# Patient Record
Sex: FEMALE | Race: ASIAN
[De-identification: names, ages, dates, MRNs, and addresses within clinical notes are randomized per-mention and may not be internally consistent; named-entity substitution may affect disease eponyms.]

---

## 2017-07-06 ENCOUNTER — APPOINTMENT (OUTPATIENT)
Dept: CT IMAGING | Facility: HOSPITAL | Age: 57
End: 2017-07-06

## 2017-07-31 PROBLEM — Z00.00 ENCOUNTER FOR PREVENTIVE HEALTH EXAMINATION: Status: ACTIVE | Noted: 2017-07-31

## 2017-08-03 ENCOUNTER — APPOINTMENT (OUTPATIENT)
Dept: CT IMAGING | Facility: HOSPITAL | Age: 57
End: 2017-08-03

## 2017-08-14 ENCOUNTER — RESULT REVIEW (OUTPATIENT)
Age: 57
End: 2017-08-14

## 2017-08-14 ENCOUNTER — INPATIENT (INPATIENT)
Facility: HOSPITAL | Age: 57
LOS: 0 days | Discharge: HOME | DRG: 305 | End: 2017-08-15
Attending: FAMILY MEDICINE | Admitting: FAMILY MEDICINE
Payer: COMMERCIAL

## 2017-08-14 ENCOUNTER — APPOINTMENT (OUTPATIENT)
Dept: INTERVENTIONAL RADIOLOGY/VASCULAR | Facility: HOSPITAL | Age: 57
End: 2017-08-14
Payer: MEDICAID

## 2017-08-14 ENCOUNTER — OUTPATIENT (OUTPATIENT)
Dept: OUTPATIENT SERVICES | Facility: HOSPITAL | Age: 57
LOS: 1 days | End: 2017-08-14
Payer: COMMERCIAL

## 2017-08-14 VITALS
HEART RATE: 69 BPM | OXYGEN SATURATION: 99 % | HEIGHT: 63 IN | RESPIRATION RATE: 16 BRPM | WEIGHT: 110.01 LBS | TEMPERATURE: 100 F | DIASTOLIC BLOOD PRESSURE: 93 MMHG | SYSTOLIC BLOOD PRESSURE: 178 MMHG

## 2017-08-14 DIAGNOSIS — I16.0 HYPERTENSIVE URGENCY: ICD-10-CM

## 2017-08-14 DIAGNOSIS — N18.9 CHRONIC KIDNEY DISEASE, UNSPECIFIED: ICD-10-CM

## 2017-08-14 DIAGNOSIS — Z29.9 ENCOUNTER FOR PROPHYLACTIC MEASURES, UNSPECIFIED: ICD-10-CM

## 2017-08-14 DIAGNOSIS — E11.9 TYPE 2 DIABETES MELLITUS WITHOUT COMPLICATIONS: ICD-10-CM

## 2017-08-14 DIAGNOSIS — I10 ESSENTIAL (PRIMARY) HYPERTENSION: ICD-10-CM

## 2017-08-14 DIAGNOSIS — N04.9 NEPHROTIC SYNDROME WITH UNSPECIFIED MORPHOLOGIC CHANGES: ICD-10-CM

## 2017-08-14 DIAGNOSIS — E78.00 PURE HYPERCHOLESTEROLEMIA, UNSPECIFIED: ICD-10-CM

## 2017-08-14 LAB
ALBUMIN SERPL ELPH-MCNC: 2.1 G/DL — LOW (ref 3.5–5)
ALP SERPL-CCNC: 69 U/L — SIGNIFICANT CHANGE UP (ref 40–120)
ALT FLD-CCNC: 15 U/L DA — SIGNIFICANT CHANGE UP (ref 10–60)
ANION GAP SERPL CALC-SCNC: 5 MMOL/L — SIGNIFICANT CHANGE UP (ref 5–17)
APTT BLD: 29.3 SEC — SIGNIFICANT CHANGE UP (ref 27.5–37.4)
AST SERPL-CCNC: 22 U/L — SIGNIFICANT CHANGE UP (ref 10–40)
BASOPHILS # BLD AUTO: 0.1 K/UL — SIGNIFICANT CHANGE UP (ref 0–0.2)
BASOPHILS NFR BLD AUTO: 1.2 % — SIGNIFICANT CHANGE UP (ref 0–2)
BILIRUB SERPL-MCNC: 0.4 MG/DL — SIGNIFICANT CHANGE UP (ref 0.2–1.2)
BUN SERPL-MCNC: 33 MG/DL — HIGH (ref 7–18)
CALCIUM SERPL-MCNC: 8.7 MG/DL — SIGNIFICANT CHANGE UP (ref 8.4–10.5)
CHLORIDE SERPL-SCNC: 109 MMOL/L — HIGH (ref 96–108)
CK MB BLD-MCNC: 1.4 % — SIGNIFICANT CHANGE UP (ref 0–3.5)
CK MB CFR SERPL CALC: 1.5 NG/ML — SIGNIFICANT CHANGE UP (ref 0–3.6)
CK SERPL-CCNC: 105 U/L — SIGNIFICANT CHANGE UP (ref 21–215)
CO2 SERPL-SCNC: 27 MMOL/L — SIGNIFICANT CHANGE UP (ref 22–31)
CREAT SERPL-MCNC: 2.04 MG/DL — HIGH (ref 0.5–1.3)
EOSINOPHIL # BLD AUTO: 0.5 K/UL — SIGNIFICANT CHANGE UP (ref 0–0.5)
EOSINOPHIL NFR BLD AUTO: 6.3 % — HIGH (ref 0–6)
GLUCOSE SERPL-MCNC: 132 MG/DL — HIGH (ref 70–99)
HCT VFR BLD CALC: 36.9 % — SIGNIFICANT CHANGE UP (ref 34.5–45)
HGB BLD-MCNC: 11.8 G/DL — SIGNIFICANT CHANGE UP (ref 11.5–15.5)
INR BLD: 0.87 RATIO — LOW (ref 0.88–1.16)
LYMPHOCYTES # BLD AUTO: 2.1 K/UL — SIGNIFICANT CHANGE UP (ref 1–3.3)
LYMPHOCYTES # BLD AUTO: 28.7 % — SIGNIFICANT CHANGE UP (ref 13–44)
MCHC RBC-ENTMCNC: 26.4 PG — LOW (ref 27–34)
MCHC RBC-ENTMCNC: 31.9 GM/DL — LOW (ref 32–36)
MCV RBC AUTO: 82.7 FL — SIGNIFICANT CHANGE UP (ref 80–100)
MONOCYTES # BLD AUTO: 0.4 K/UL — SIGNIFICANT CHANGE UP (ref 0–0.9)
MONOCYTES NFR BLD AUTO: 6 % — SIGNIFICANT CHANGE UP (ref 2–14)
NEUTROPHILS # BLD AUTO: 4.2 K/UL — SIGNIFICANT CHANGE UP (ref 1.8–7.4)
NEUTROPHILS NFR BLD AUTO: 57.9 % — SIGNIFICANT CHANGE UP (ref 43–77)
NT-PROBNP SERPL-SCNC: 599 PG/ML — HIGH (ref 0–125)
PLATELET # BLD AUTO: 283 K/UL — SIGNIFICANT CHANGE UP (ref 150–400)
POTASSIUM SERPL-MCNC: 4.3 MMOL/L — SIGNIFICANT CHANGE UP (ref 3.5–5.3)
POTASSIUM SERPL-SCNC: 4.3 MMOL/L — SIGNIFICANT CHANGE UP (ref 3.5–5.3)
PROT SERPL-MCNC: 6.9 G/DL — SIGNIFICANT CHANGE UP (ref 6–8.3)
PROTHROM AB SERPL-ACNC: 9.5 SEC — LOW (ref 9.8–12.7)
RBC # BLD: 4.46 M/UL — SIGNIFICANT CHANGE UP (ref 3.8–5.2)
RBC # FLD: 11.6 % — SIGNIFICANT CHANGE UP (ref 10.3–14.5)
SODIUM SERPL-SCNC: 141 MMOL/L — SIGNIFICANT CHANGE UP (ref 135–145)
TROPONIN I SERPL-MCNC: <0.015 NG/ML — SIGNIFICANT CHANGE UP (ref 0–0.04)
WBC # BLD: 7.2 K/UL — SIGNIFICANT CHANGE UP (ref 3.8–10.5)
WBC # FLD AUTO: 7.2 K/UL — SIGNIFICANT CHANGE UP (ref 3.8–10.5)

## 2017-08-14 PROCEDURE — 88305 TISSUE EXAM BY PATHOLOGIST: CPT

## 2017-08-14 PROCEDURE — 88305 TISSUE EXAM BY PATHOLOGIST: CPT | Mod: 26

## 2017-08-14 PROCEDURE — 88346 IMFLUOR 1ST 1ANTB STAIN PX: CPT | Mod: 26

## 2017-08-14 PROCEDURE — 88348 ELECTRON MICROSCOPY DX: CPT | Mod: 26

## 2017-08-14 PROCEDURE — 88348 ELECTRON MICROSCOPY DX: CPT

## 2017-08-14 PROCEDURE — 71010: CPT | Mod: 26

## 2017-08-14 PROCEDURE — 88329 PATH CONSLTJ DRG SURG: CPT

## 2017-08-14 PROCEDURE — 88312 SPECIAL STAINS GROUP 1: CPT

## 2017-08-14 PROCEDURE — 50200 RENAL BIOPSY PERQ: CPT | Mod: LT

## 2017-08-14 PROCEDURE — 88312 SPECIAL STAINS GROUP 1: CPT | Mod: 26

## 2017-08-14 PROCEDURE — 88305 TISSUE EXAM BY PATHOLOGIST: CPT | Mod: 26,59

## 2017-08-14 PROCEDURE — 88350 IMFLUOR EA ADDL 1ANTB STN PX: CPT

## 2017-08-14 PROCEDURE — 88313 SPECIAL STAINS GROUP 2: CPT

## 2017-08-14 PROCEDURE — 76942 ECHO GUIDE FOR BIOPSY: CPT | Mod: 26

## 2017-08-14 PROCEDURE — 88313 SPECIAL STAINS GROUP 2: CPT | Mod: 26

## 2017-08-14 PROCEDURE — 76942 ECHO GUIDE FOR BIOPSY: CPT

## 2017-08-14 PROCEDURE — 88350 IMFLUOR EA ADDL 1ANTB STN PX: CPT | Mod: 26

## 2017-08-14 PROCEDURE — 88346 IMFLUOR 1ST 1ANTB STAIN PX: CPT

## 2017-08-14 PROCEDURE — 99285 EMERGENCY DEPT VISIT HI MDM: CPT

## 2017-08-14 PROCEDURE — 50200 RENAL BIOPSY PERQ: CPT

## 2017-08-14 RX ORDER — FUROSEMIDE 40 MG
20 TABLET ORAL ONCE
Qty: 0 | Refills: 0 | Status: COMPLETED | OUTPATIENT
Start: 2017-08-14 | End: 2017-08-14

## 2017-08-14 RX ORDER — TIMOLOL 0.5 %
1 DROPS OPHTHALMIC (EYE)
Qty: 0 | Refills: 0 | COMMUNITY

## 2017-08-14 RX ORDER — SODIUM CHLORIDE 9 MG/ML
3 INJECTION INTRAMUSCULAR; INTRAVENOUS; SUBCUTANEOUS ONCE
Qty: 0 | Refills: 0 | Status: COMPLETED | OUTPATIENT
Start: 2017-08-14 | End: 2017-08-14

## 2017-08-14 RX ORDER — TIMOLOL 0.5 %
1 DROPS OPHTHALMIC (EYE)
Qty: 0 | Refills: 0 | Status: DISCONTINUED | OUTPATIENT
Start: 2017-08-14 | End: 2017-08-15

## 2017-08-14 RX ORDER — AMLODIPINE BESYLATE 2.5 MG/1
2.5 TABLET ORAL DAILY
Qty: 0 | Refills: 0 | Status: DISCONTINUED | OUTPATIENT
Start: 2017-08-14 | End: 2017-08-15

## 2017-08-14 RX ORDER — HEPARIN SODIUM 5000 [USP'U]/ML
5000 INJECTION INTRAVENOUS; SUBCUTANEOUS EVERY 8 HOURS
Qty: 0 | Refills: 0 | Status: DISCONTINUED | OUTPATIENT
Start: 2017-08-14 | End: 2017-08-15

## 2017-08-14 RX ORDER — LOSARTAN POTASSIUM 100 MG/1
25 TABLET, FILM COATED ORAL DAILY
Qty: 0 | Refills: 0 | Status: DISCONTINUED | OUTPATIENT
Start: 2017-08-14 | End: 2017-08-15

## 2017-08-14 RX ORDER — FUROSEMIDE 40 MG
1 TABLET ORAL
Qty: 0 | Refills: 0 | COMMUNITY

## 2017-08-14 RX ORDER — AMLODIPINE BESYLATE 2.5 MG/1
1 TABLET ORAL
Qty: 0 | Refills: 0 | COMMUNITY

## 2017-08-14 RX ORDER — INSULIN LISPRO 100/ML
VIAL (ML) SUBCUTANEOUS
Qty: 0 | Refills: 0 | Status: DISCONTINUED | OUTPATIENT
Start: 2017-08-14 | End: 2017-08-15

## 2017-08-14 RX ORDER — ATORVASTATIN CALCIUM 80 MG/1
10 TABLET, FILM COATED ORAL AT BEDTIME
Qty: 0 | Refills: 0 | Status: DISCONTINUED | OUTPATIENT
Start: 2017-08-14 | End: 2017-08-15

## 2017-08-14 RX ORDER — LABETALOL HCL 100 MG
10 TABLET ORAL ONCE
Qty: 0 | Refills: 0 | Status: COMPLETED | OUTPATIENT
Start: 2017-08-14 | End: 2017-08-14

## 2017-08-14 RX ORDER — LOSARTAN POTASSIUM 100 MG/1
1 TABLET, FILM COATED ORAL
Qty: 0 | Refills: 0 | COMMUNITY

## 2017-08-14 RX ADMIN — ATORVASTATIN CALCIUM 10 MILLIGRAM(S): 80 TABLET, FILM COATED ORAL at 23:15

## 2017-08-14 RX ADMIN — AMLODIPINE BESYLATE 2.5 MILLIGRAM(S): 2.5 TABLET ORAL at 17:23

## 2017-08-14 RX ADMIN — Medication 10 MILLIGRAM(S): at 16:13

## 2017-08-14 RX ADMIN — LOSARTAN POTASSIUM 25 MILLIGRAM(S): 100 TABLET, FILM COATED ORAL at 17:23

## 2017-08-14 RX ADMIN — HEPARIN SODIUM 5000 UNIT(S): 5000 INJECTION INTRAVENOUS; SUBCUTANEOUS at 23:15

## 2017-08-14 RX ADMIN — Medication 20 MILLIGRAM(S): at 13:24

## 2017-08-14 RX ADMIN — SODIUM CHLORIDE 3 MILLILITER(S): 9 INJECTION INTRAMUSCULAR; INTRAVENOUS; SUBCUTANEOUS at 15:46

## 2017-08-14 NOTE — H&P ADULT - NSHPPHYSICALEXAM_GEN_ALL_CORE
Physical Exam    General: WN/WD NAD  Neurology: A&Ox3  Respiratory: CTA B/L, no wheezes, no rhonchi, no rales  CV: RRR, S1S2, no murmur  Abdominal: Soft, NT, ND no palpable mass, bowel sounds positive  MSK: No edema, + peripheral pulses  No other abnormalities seen on exam except mentioned above.

## 2017-08-14 NOTE — ED ADULT NURSE NOTE - OBJECTIVE STATEMENT
PT P/W ELEVATED B/P  CAME FROM OUTPATIENT RADIOLOGY PT P/W ELEVATED B/P  CAME FROM OUTPATIENT RADIOLOGY. DENIES C/P DIZZINESS OR SOB

## 2017-08-14 NOTE — ED PROVIDER NOTE - OBJECTIVE STATEMENT
Kiswahili  #[id number]. 55 y/o F pt with PMHx of DM, HTN, Hypercholesterolemia, and CKD and no significant PSHx sent to ED with elevated blood pressure s/p renal biopsy done in IR today. Pt states exhibiting elevated blood pressure despite treatment while in IR; pt was sent to ED for monitoring. Pt reports being on Amlodipine for blood pressure, and states she took Amlodipine earlier today. Pt denies HA, CP, SOB, dizziness, or any other complaints. NKDA.

## 2017-08-14 NOTE — ASU DISCHARGE PLAN (ADULT/PEDIATRIC). - SPECIAL INSTRUCTIONS
watch for signs of infections fever, chills , redness, swelling at the site of procedure . if present go not nearest ER or call PCP .

## 2017-08-14 NOTE — ED PROVIDER NOTE - MEDICAL DECISION MAKING DETAILS
57 y/o F pt with uncontrolled hypertension in pt with renal biopsy done today. Will do labs, IV antihypertensive, and admission for monitoring.

## 2017-08-14 NOTE — H&P ADULT - ATTENDING COMMENTS
patient seen and examined. case fully discussed with  ER attending and medical resident. reviewed chief complaint. reviewed review of systems. reviewed list of medication,  reviewed physical exam. reviewed assessment and plan. agree with full H and P. Spoke in detail to private nephrologist Dr. Benson and IR attending. Will monitor CBC, and Creatinine. Continue DVT prophylaxis.

## 2017-08-14 NOTE — ASU DISCHARGE PLAN (ADULT/PEDIATRIC). - MEDICATION SUMMARY - MEDICATIONS TO TAKE
I will START or STAY ON the medications listed below when I get home from the hospital:    losartan 25 mg oral tablet  -- 1 tab(s) by mouth once a day  -- Indication: For as directed     glipiZIDE 10 mg oral tablet, extended release  -- 1 tab(s) by mouth once a day  -- Indication: For as directed     atorvastatin 10 mg oral tablet  -- 1 tab(s) by mouth once a day  -- Indication: For as directed     amLODIPine 2.5 mg oral tablet  -- 1 tab(s) by mouth once a day  -- Indication: For as directed     furosemide 40 mg oral tablet  -- 1 tab(s) by mouth once a day  -- Indication: For as directed     timolol maleate 0.25% ophthalmic gel forming solution  -- 1 drop(s) to each affected eye once a day  -- Indication: For as directed

## 2017-08-14 NOTE — H&P ADULT - NSHPLABSRESULTS_GEN_ALL_CORE
Labs:                        11.8   7.2   )-----------( 283      ( 14 Aug 2017 15:50 )             36.9     08-14    141  |  109<H>  |  33<H>  ----------------------------<  132<H>  4.3   |  27  |  2.04<H>    Ca    8.7      14 Aug 2017 15:50    TPro  6.9  /  Alb  2.1<L>  /  TBili  0.4  /  DBili  x   /  AST  22  /  ALT  15  /  AlkPhos  69  08-14

## 2017-08-14 NOTE — H&P ADULT - NSHPREVIEWOFSYSTEMS_GEN_ALL_CORE
REVIEW OF SYSTEMS:    CONSTITUTIONAL: No weakness, fevers or chills  RESPIRATORY: No cough, wheezing, hemoptysis; No shortness of breath  CARDIOVASCULAR: No chest pain or palpitations  GASTROINTESTINAL: No abdominal or epigastric pain. No nausea, vomiting, or hematemesis; No diarrhea or constipation. No melena or hematochezia.  NEUROLOGICAL: No numbness or weakness  All other review of systems is negative unless indicated above.

## 2017-08-14 NOTE — H&P ADULT - HISTORY OF PRESENT ILLNESS
56 year old Greek speaking female from home lives with  with PMH of DM, HTN, HLD, CKD, DM retinopathy and DM nephropathy was admitted for HTN urgency. Patient was here today for outpatient IR guided kidney biopsy referred by nephrologist Dr Benson and found to have persistent HTN during procedure. Patient SBP was in 180-190s and got metoprolol, Versed and Lasix push and did not improve. In ED patient got 1 dose of 10mg labetalol push and BP was improved to 151/79. Patient denied any headache, nausea, vomiting, abd pain, dizziness, and any other symptoms.   at bedside help translated for the patient.

## 2017-08-14 NOTE — H&P ADULT - ASSESSMENT
56 year old Slovenian speaking female from home lives with  with PMH of DM, HTN, HLD, CKD, DM retinopathy and DM nephropathy was admitted for HTN urgency. Patient was here today for outpatient IR guided kidney biopsy referred by nephrologist Dr Benson and found to have persistent HTN during procedure. Patient SBP was in 180-190s and got metoprolol, Versed and Lasix push and did not improve. In ED patient got 1 dose of 10mg labetalol push and BP was improved to 151/79. Patient denied any headache, nausea, vomiting, abd pain, dizziness, and any other symptoms.   at bedside help translated for the patient.

## 2017-08-14 NOTE — H&P ADULT - PROBLEM SELECTOR PLAN 1
-Patient bp is currently in 150. Continue current medication. Amlodipine and losartan.  BP closely monitor.   -CE 1 set negative. On tele monitor for HTN urgency.   -f/u TSH, A1C and lipid panel  -follow up T2. -Patient bp is currently in 150. Continue current medication. Amlodipine and losartan.  BP closely monitor.   -EKG with no acute changes. CE 1 set negative. On tele monitor for HTN urgency.   -f/u TSH, A1C and lipid panel  -follow up T2.

## 2017-08-14 NOTE — ED ADULT TRIAGE NOTE - CHIEF COMPLAINT QUOTE
pt sent from the out patient radiology for elevated blood pressure .pt received versed , fentanyl , lasix and lopressor

## 2017-08-15 ENCOUNTER — TRANSCRIPTION ENCOUNTER (OUTPATIENT)
Age: 57
End: 2017-08-15

## 2017-08-15 VITALS
DIASTOLIC BLOOD PRESSURE: 75 MMHG | HEART RATE: 71 BPM | SYSTOLIC BLOOD PRESSURE: 135 MMHG | RESPIRATION RATE: 17 BRPM | OXYGEN SATURATION: 96 % | TEMPERATURE: 98 F

## 2017-08-15 LAB
ANION GAP SERPL CALC-SCNC: 8 MMOL/L — SIGNIFICANT CHANGE UP (ref 5–17)
BUN SERPL-MCNC: 42 MG/DL — HIGH (ref 7–18)
CALCIUM SERPL-MCNC: 8.5 MG/DL — SIGNIFICANT CHANGE UP (ref 8.4–10.5)
CHLORIDE SERPL-SCNC: 105 MMOL/L — SIGNIFICANT CHANGE UP (ref 96–108)
CHOLEST SERPL-MCNC: 222 MG/DL — HIGH (ref 10–199)
CK MB BLD-MCNC: 1.2 % — SIGNIFICANT CHANGE UP (ref 0–3.5)
CK MB BLD-MCNC: 1.4 % — SIGNIFICANT CHANGE UP (ref 0–3.5)
CK MB CFR SERPL CALC: 1.3 NG/ML — SIGNIFICANT CHANGE UP (ref 0–3.6)
CK MB CFR SERPL CALC: 1.6 NG/ML — SIGNIFICANT CHANGE UP (ref 0–3.6)
CK SERPL-CCNC: 111 U/L — SIGNIFICANT CHANGE UP (ref 21–215)
CK SERPL-CCNC: 114 U/L — SIGNIFICANT CHANGE UP (ref 21–215)
CO2 SERPL-SCNC: 25 MMOL/L — SIGNIFICANT CHANGE UP (ref 22–31)
CREAT SERPL-MCNC: 2.62 MG/DL — HIGH (ref 0.5–1.3)
FOLATE SERPL-MCNC: 10.1 NG/ML — SIGNIFICANT CHANGE UP (ref 4.8–24.2)
GLUCOSE SERPL-MCNC: 288 MG/DL — HIGH (ref 70–99)
HBA1C BLD-MCNC: 8.9 % — HIGH (ref 4–5.6)
HCT VFR BLD CALC: 33.8 % — LOW (ref 34.5–45)
HDLC SERPL-MCNC: 39 MG/DL — LOW (ref 40–125)
HGB BLD-MCNC: 11 G/DL — LOW (ref 11.5–15.5)
LIPID PNL WITH DIRECT LDL SERPL: 74 MG/DL — SIGNIFICANT CHANGE UP
MAGNESIUM SERPL-MCNC: 2.2 MG/DL — SIGNIFICANT CHANGE UP (ref 1.6–2.6)
MCHC RBC-ENTMCNC: 26.3 PG — LOW (ref 27–34)
MCHC RBC-ENTMCNC: 32.5 GM/DL — SIGNIFICANT CHANGE UP (ref 32–36)
MCV RBC AUTO: 80.9 FL — SIGNIFICANT CHANGE UP (ref 80–100)
PHOSPHATE SERPL-MCNC: 4 MG/DL — SIGNIFICANT CHANGE UP (ref 2.5–4.5)
PLATELET # BLD AUTO: 296 K/UL — SIGNIFICANT CHANGE UP (ref 150–400)
POTASSIUM SERPL-MCNC: 4 MMOL/L — SIGNIFICANT CHANGE UP (ref 3.5–5.3)
POTASSIUM SERPL-SCNC: 4 MMOL/L — SIGNIFICANT CHANGE UP (ref 3.5–5.3)
RBC # BLD: 4.18 M/UL — SIGNIFICANT CHANGE UP (ref 3.8–5.2)
RBC # FLD: 12 % — SIGNIFICANT CHANGE UP (ref 10.3–14.5)
SODIUM SERPL-SCNC: 138 MMOL/L — SIGNIFICANT CHANGE UP (ref 135–145)
SURGICAL PATHOLOGY FINAL REPORT - CH: SIGNIFICANT CHANGE UP
TOTAL CHOLESTEROL/HDL RATIO MEASUREMENT: 5.7 RATIO — SIGNIFICANT CHANGE UP (ref 3.3–7.1)
TRIGL SERPL-MCNC: 545 MG/DL — HIGH (ref 10–149)
TROPONIN I SERPL-MCNC: <0.015 NG/ML — SIGNIFICANT CHANGE UP (ref 0–0.04)
TROPONIN I SERPL-MCNC: <0.015 NG/ML — SIGNIFICANT CHANGE UP (ref 0–0.04)
TSH SERPL-MCNC: 1.96 UU/ML — SIGNIFICANT CHANGE UP (ref 0.34–4.82)
VIT B12 SERPL-MCNC: 624 PG/ML — SIGNIFICANT CHANGE UP (ref 243–894)
VIT D25+D1,25 OH+D1,25 PNL SERPL-MCNC: 15.3 PG/ML — LOW (ref 19.9–79.3)
WBC # BLD: 10.2 K/UL — SIGNIFICANT CHANGE UP (ref 3.8–10.5)
WBC # FLD AUTO: 10.2 K/UL — SIGNIFICANT CHANGE UP (ref 3.8–10.5)

## 2017-08-15 PROCEDURE — 82652 VIT D 1 25-DIHYDROXY: CPT

## 2017-08-15 PROCEDURE — 83880 ASSAY OF NATRIURETIC PEPTIDE: CPT

## 2017-08-15 PROCEDURE — 71045 X-RAY EXAM CHEST 1 VIEW: CPT

## 2017-08-15 PROCEDURE — 84484 ASSAY OF TROPONIN QUANT: CPT

## 2017-08-15 PROCEDURE — 80053 COMPREHEN METABOLIC PANEL: CPT

## 2017-08-15 PROCEDURE — 82553 CREATINE MB FRACTION: CPT

## 2017-08-15 PROCEDURE — 82607 VITAMIN B-12: CPT

## 2017-08-15 PROCEDURE — 82746 ASSAY OF FOLIC ACID SERUM: CPT

## 2017-08-15 PROCEDURE — 85730 THROMBOPLASTIN TIME PARTIAL: CPT

## 2017-08-15 PROCEDURE — 83735 ASSAY OF MAGNESIUM: CPT

## 2017-08-15 PROCEDURE — 80048 BASIC METABOLIC PNL TOTAL CA: CPT

## 2017-08-15 PROCEDURE — 99285 EMERGENCY DEPT VISIT HI MDM: CPT | Mod: 25

## 2017-08-15 PROCEDURE — G0378: CPT

## 2017-08-15 PROCEDURE — 85610 PROTHROMBIN TIME: CPT

## 2017-08-15 PROCEDURE — 84443 ASSAY THYROID STIM HORMONE: CPT

## 2017-08-15 PROCEDURE — 84100 ASSAY OF PHOSPHORUS: CPT

## 2017-08-15 PROCEDURE — 83036 HEMOGLOBIN GLYCOSYLATED A1C: CPT

## 2017-08-15 PROCEDURE — 80061 LIPID PANEL: CPT

## 2017-08-15 PROCEDURE — 96374 THER/PROPH/DIAG INJ IV PUSH: CPT

## 2017-08-15 PROCEDURE — 85027 COMPLETE CBC AUTOMATED: CPT

## 2017-08-15 PROCEDURE — 82550 ASSAY OF CK (CPK): CPT

## 2017-08-15 RX ORDER — ATORVASTATIN CALCIUM 80 MG/1
1 TABLET, FILM COATED ORAL
Qty: 30 | Refills: 0 | OUTPATIENT
Start: 2017-08-15 | End: 2017-09-14

## 2017-08-15 RX ORDER — ATORVASTATIN CALCIUM 80 MG/1
1 TABLET, FILM COATED ORAL
Qty: 0 | Refills: 0 | COMMUNITY

## 2017-08-15 RX ORDER — ASPIRIN/CALCIUM CARB/MAGNESIUM 324 MG
1 TABLET ORAL
Qty: 30 | Refills: 0 | OUTPATIENT
Start: 2017-08-15 | End: 2017-09-14

## 2017-08-15 RX ORDER — GEMFIBROZIL 600 MG
1 TABLET ORAL
Qty: 60 | Refills: 0 | OUTPATIENT
Start: 2017-08-15 | End: 2017-09-14

## 2017-08-15 RX ADMIN — LOSARTAN POTASSIUM 25 MILLIGRAM(S): 100 TABLET, FILM COATED ORAL at 06:22

## 2017-08-15 RX ADMIN — Medication 1 DROP(S): at 06:14

## 2017-08-15 RX ADMIN — Medication 3: at 08:41

## 2017-08-15 RX ADMIN — AMLODIPINE BESYLATE 2.5 MILLIGRAM(S): 2.5 TABLET ORAL at 06:14

## 2017-08-15 RX ADMIN — HEPARIN SODIUM 5000 UNIT(S): 5000 INJECTION INTRAVENOUS; SUBCUTANEOUS at 06:14

## 2017-08-15 NOTE — DISCHARGE NOTE ADULT - PATIENT PORTAL LINK FT
“You can access the [employer] Patient Portal, offered by [facility], by registering with the following website: [email or web address]”

## 2017-08-15 NOTE — DISCHARGE NOTE ADULT - PLAN OF CARE
Renal biopsy to diagnose the cause Comply with medications and follow up with Dr. Benson for results Check BP at home, if stays high, need medication adjustment, f/u with PMD Goal HbA1c 5.8-6.4 Elevated lipid panel, increasing Lipitor to 40 mg and adding lopid, repeat Lipi panel in 3 months HbA1c 8.9, consider insulin

## 2017-08-15 NOTE — DIETITIAN INITIAL EVALUATION ADULT. - NS AS NUTRI INTERV ED CONTENT
Survival information/Give education & copy of My Plate Planner  with carbohydrate counting/Nutrition relationship to health/disease

## 2017-08-15 NOTE — DISCHARGE NOTE ADULT - HOSPITAL COURSE
56 year old Armenian speaking female from home lives with  with PMH of DM, HTN, HLD, CKD, DM retinopathy and DM nephropathy was sent for renal biopsy by the nephrologist Dr. Benson because of worsening kidney functions. Left kidney biopsy was done yesterday. Surgical pathology is testing. Patient BP was elevated in ED and responded to metoprolol. Lipid panel is abnormal.  She is medically stable, H/H at baseline, as per IR patient can be discharged to home.    Comply with medications and follow up with PMD in 1 week.  Use diet high in fiber and low in salt  Check BP at home if uncontrolled need f/u with PMD  F/u kidney biopsy results with PMD  Repeat CBC in 2 days (post procedure monitoring) 56 year old Chinese speaking female from home lives with  with PMH of DM, HTN, HLD, CKD, DM retinopathy and DM nephropathy was sent for renal biopsy by the nephrologist Dr. Benson because of worsening kidney functions. Left kidney biopsy was done yesterday. Surgical pathology is testing. Patient BP was elevated in ED and responded to metoprolol. Lipid panel is abnormal.  She is medically stable, H/H at baseline, as per IR patient can be discharged to home.    Comply with medications and follow up with PMD in 1 week.  Elevated lipid panel, increasing Lipitor to 40 mg and adding lopid, repeat Lipi panel in 3 months  Use diet high in fiber and low in salt  Check BP at home if uncontrolled need f/u with PMD  F/u kidney biopsy results with PMD  Repeat CBC in 2 days (post procedure monitoring)

## 2017-08-15 NOTE — PROGRESS NOTE ADULT - ASSESSMENT
Monitor vitals .Follow up result of renal biopsy as outpt with Dr. Benson ( Nephrologist) in 1 week.  As far as IR pt can be d/c home today if BP is under control and follow up with PCP.

## 2017-08-15 NOTE — DISCHARGE NOTE ADULT - CARE PLAN
Principal Discharge DX:	CKD (chronic kidney disease)  Goal:	Renal biopsy to diagnose the cause  Instructions for follow-up, activity and diet:	Comply with medications and follow up with Dr. Benson for results  Secondary Diagnosis:	Diabetes mellitus  Goal:	Goal HbA1c 5.8-6.4  Secondary Diagnosis:	Hypertension  Instructions for follow-up, activity and diet:	Check BP at home, if stays high, need medication adjustment, f/u with PMD Principal Discharge DX:	CKD (chronic kidney disease)  Goal:	Renal biopsy to diagnose the cause  Instructions for follow-up, activity and diet:	Comply with medications and follow up with Dr. Benson for results  Secondary Diagnosis:	Diabetes mellitus  Goal:	Goal HbA1c 5.8-6.4  Instructions for follow-up, activity and diet:	HbA1c 8.9, consider insulin  Secondary Diagnosis:	Hypertension  Instructions for follow-up, activity and diet:	Check BP at home, if stays high, need medication adjustment, f/u with PMD  Secondary Diagnosis:	Hypercholesteremia  Instructions for follow-up, activity and diet:	Elevated lipid panel, increasing Lipitor to 40 mg and adding lopid, repeat Lipi panel in 3 months

## 2017-08-15 NOTE — DIETITIAN INITIAL EVALUATION ADULT. - OTHER INFO
Patient seen for A1C> 7% & BMI 19. Patient from home lives with family. Visited pt. with son at bedside, son agreed to translate,  per pt. appetite fair 4.5 days, eats 2-3meals recently started to crave for "sweet", denies nausea/vomiting or diarrhea PTD, stated -115 Lbs & stable at this wt. >2yrs, dx. as diabetic 7yrs age & on metformin at home, per pt. never received diet / diabetic meal planning education, give son education & copies of My Plate Planner with carbohydrate counting in English to take home. In house pt. consuming 50% of meals & tolerating. observed lunch meal. D/W RN, skin intact.

## 2017-08-15 NOTE — DISCHARGE NOTE ADULT - MEDICATION SUMMARY - MEDICATIONS TO TAKE
I will START or STAY ON the medications listed below when I get home from the hospital:    losartan 25 mg oral tablet  -- 1 tab(s) by mouth once a day  -- Indication: For HTN    glipiZIDE 10 mg oral tablet, extended release  -- 1 tab(s) by mouth once a day  -- Indication: For Dm    atorvastatin 10 mg oral tablet  -- 1 tab(s) by mouth once a day  -- Indication: For Hld    amLODIPine 2.5 mg oral tablet  -- 1 tab(s) by mouth once a day  -- Indication: For HTN    timolol maleate 0.25% ophthalmic gel forming solution  -- 1 drop(s) to each affected eye once a day  -- Indication: For glaucoma I will START or STAY ON the medications listed below when I get home from the hospital:    aspirin 81 mg oral delayed release tablet  -- 1 tab(s) by mouth once a day  -- Swallow whole.  Do not crush.  Take with food or milk.    -- Indication: For Need for prophylactic measure    losartan 25 mg oral tablet  -- 1 tab(s) by mouth once a day  -- Indication: For HTN    glipiZIDE 10 mg oral tablet, extended release  -- 1 tab(s) by mouth once a day  -- Indication: For Dm    atorvastatin 40 mg oral tablet  -- 1 tab(s) by mouth once a day  -- Avoid grapefruit and grapefruit juice while taking this medication.  Do not take this drug if you are pregnant.  It is very important that you take or use this exactly as directed.  Do not skip doses or discontinue unless directed by your doctor.  Obtain medical advice before taking any non-prescription drugs as some may affect the action of this medication.  Take with food or milk.    -- Indication: For HLD    Lopid 600 mg oral tablet  -- 1 tab(s) by mouth 2 times a day  -- It is very important that you take or use this exactly as directed.  Do not skip doses or discontinue unless directed by your doctor.    -- Indication: For HLD    amLODIPine 2.5 mg oral tablet  -- 1 tab(s) by mouth once a day  -- Indication: For HTN    timolol maleate 0.25% ophthalmic gel forming solution  -- 1 drop(s) to each affected eye once a day  -- Indication: For glaucoma I will START or STAY ON the medications listed below when I get home from the hospital:    aspirin 81 mg oral delayed release tablet  -- 1 tab(s) by mouth once a day  -- Swallow whole.  Do not crush.  Take with food or milk.    -- Indication: For Need for prophylactic measure    losartan 25 mg oral tablet  -- 1 tab(s) by mouth once a day  -- Indication: For HTN    glipiZIDE 10 mg oral tablet, extended release  -- 1 tab(s) by mouth once a day  -- Indication: For Dm    atorvastatin 40 mg oral tablet  -- 1 tab(s) by mouth once a day.  -- Avoid grapefruit and grapefruit juice while taking this medication.  Do not take this drug if you are pregnant.  It is very important that you take or use this exactly as directed.  Do not skip doses or discontinue unless directed by your doctor.  Obtain medical advice before taking any non-prescription drugs as some may affect the action of this medication.  Take with food or milk.    -- Indication: For Hld    Lopid 600 mg oral tablet  -- 1 tab(s) by mouth 2 times a day.  -- It is very important that you take or use this exactly as directed.  Do not skip doses or discontinue unless directed by your doctor.    -- Indication: For Hld    amLODIPine 2.5 mg oral tablet  -- 1 tab(s) by mouth once a day  -- Indication: For HTN    timolol maleate 0.25% ophthalmic gel forming solution  -- 1 drop(s) to each affected eye once a day  -- Indication: For glaucoma

## 2017-08-15 NOTE — PROGRESS NOTE ADULT - SUBJECTIVE AND OBJECTIVE BOX
pt seen at bedside accompanied by her family member. Pt s/p sono guided renal biopsy .  pt feels better has no active complains .  pt denies any chest pains , nausea , vomiting, abdominal pain or back pain. Site no hematoma . Dressing intact , dry, no bleeding.  Initial H/H 11.5/ 35.4 on 7/20/17 prior to biopsy. Then last night 11.8/36.9 . pt doing well. BP is under control.     Vital Signs Last 24 Hrs  T(C): 36.3 (15 Aug 2017 06:34), Max: 37.6 (14 Aug 2017 15:04)  T(F): 97.4 (15 Aug 2017 06:34), Max: 99.6 (14 Aug 2017 15:04)  HR: 74 (15 Aug 2017 06:34) (66 - 74)  BP: 155/74 (15 Aug 2017 06:34) (136/72 - 178/93)  BP(mean): --  RR: 16 (15 Aug 2017 06:34) (16 - 18)  SpO2: 97% (15 Aug 2017 06:34) (97% - 100%)                          11.0   10.2  )-----------( 296      ( 15 Aug 2017 06:42 )             33.8       08-15    138  |  105  |  42<H>  ----------------------------<  288<H>  4.0   |  25  |  2.62<H>    Ca    8.5      15 Aug 2017 06:42  Phos  4.0     08-15  Mg     2.2     08-15    TPro  6.9  /  Alb  2.1<L>  /  TBili  0.4  /  DBili  x   /  AST  22  /  ALT  15  /  AlkPhos  69  08-14

## 2017-08-17 DIAGNOSIS — E11.22 TYPE 2 DIABETES MELLITUS WITH DIABETIC CHRONIC KIDNEY DISEASE: ICD-10-CM

## 2017-08-17 DIAGNOSIS — E11.21 TYPE 2 DIABETES MELLITUS WITH DIABETIC NEPHROPATHY: ICD-10-CM

## 2017-08-17 DIAGNOSIS — I16.0 HYPERTENSIVE URGENCY: ICD-10-CM

## 2017-08-17 DIAGNOSIS — I12.9 HYPERTENSIVE CHRONIC KIDNEY DISEASE WITH STAGE 1 THROUGH STAGE 4 CHRONIC KIDNEY DISEASE, OR UNSPECIFIED CHRONIC KIDNEY DISEASE: ICD-10-CM

## 2017-08-17 DIAGNOSIS — E11.319 TYPE 2 DIABETES MELLITUS WITH UNSPECIFIED DIABETIC RETINOPATHY WITHOUT MACULAR EDEMA: ICD-10-CM

## 2017-08-17 DIAGNOSIS — N18.9 CHRONIC KIDNEY DISEASE, UNSPECIFIED: ICD-10-CM

## 2017-08-17 DIAGNOSIS — E78.5 HYPERLIPIDEMIA, UNSPECIFIED: ICD-10-CM

## 2017-09-25 NOTE — ASU DISCHARGE PLAN (ADULT/PEDIATRIC). - ITEMS TO FOLLOWUP WITH YOUR PHYSICIAN'S
----- Message from Rebeca Hill MD sent at 9/25/2017  5:57 AM CDT -----  Please put these results in an OB progress note  
please follow up Dr Benson nephrologist in one week to follow up results.

## 2018-04-17 PROBLEM — E11.9 TYPE 2 DIABETES MELLITUS WITHOUT COMPLICATIONS: Chronic | Status: ACTIVE | Noted: 2017-08-14

## 2018-04-17 PROBLEM — E78.00 PURE HYPERCHOLESTEROLEMIA, UNSPECIFIED: Chronic | Status: ACTIVE | Noted: 2017-08-14

## 2018-04-17 PROBLEM — I10 ESSENTIAL (PRIMARY) HYPERTENSION: Chronic | Status: ACTIVE | Noted: 2017-08-14

## 2018-04-25 ENCOUNTER — APPOINTMENT (OUTPATIENT)
Dept: OPHTHALMOLOGY | Facility: CLINIC | Age: 58
End: 2018-04-25

## 2018-04-25 ENCOUNTER — OUTPATIENT (OUTPATIENT)
Dept: OUTPATIENT SERVICES | Facility: HOSPITAL | Age: 58
LOS: 1 days | End: 2018-04-25

## 2018-04-30 DIAGNOSIS — H26.9 UNSPECIFIED CATARACT: ICD-10-CM

## 2021-03-17 NOTE — ED ADULT TRIAGE NOTE - HEIGHT IN CM
Pt drank cup of apple juice without difficulty, stated throat felt better after drinking juice.  ERP at bedside discussing POC.  PIV removed after verifying w/ ERP.     160.02

## 2024-05-21 NOTE — DIETITIAN INITIAL EVALUATION ADULT. - NS AS NUTRI INTERV VITAMIN
Reason for call:   [x] Refill   [] Prior Auth  [] Other:     Office:   [x] PCP/Provider - Dr Harp  [] Specialty/Provider -     Medication: Alprazolam 0.25 mg, 40    Pharmacy:   [facility]    Does the patient have enough for 3 days?   [x] Yes   [] No - Send as HP to POD     Multivitamin/mineral